# Patient Record
Sex: FEMALE | Race: WHITE
[De-identification: names, ages, dates, MRNs, and addresses within clinical notes are randomized per-mention and may not be internally consistent; named-entity substitution may affect disease eponyms.]

---

## 2020-09-26 ENCOUNTER — HOSPITAL ENCOUNTER (EMERGENCY)
Dept: HOSPITAL 50 - VM.ED | Age: 60
Discharge: HOME | End: 2020-09-26
Payer: COMMERCIAL

## 2020-09-26 DIAGNOSIS — M54.32: Primary | ICD-10-CM

## 2020-09-26 PROCEDURE — 96372 THER/PROPH/DIAG INJ SC/IM: CPT

## 2020-09-26 PROCEDURE — 99283 EMERGENCY DEPT VISIT LOW MDM: CPT

## 2020-09-26 NOTE — EDM.PDOC
ED HPI GENERAL MEDICAL PROBLEM





- General


Chief Complaint: Back Pain or Injury


Stated Complaint: PAIN IN BACK LEFT SIDE


Time Seen by Provider: 09/26/20 16:45


Source of Information: Reports: Patient


History Limitations: Reports: No Limitations





- History of Present Illness


INITIAL COMMENTS - FREE TEXT/NARRATIVE: 


Patient comes into the emergency department with complaint of left lower 

extremity sciatica.  Patient was diagnosed with sciatica approximately 3 to 4 

months ago and had been doctoring quite frequently regarding it.  She felt it 

was getting much better and did complete physical therapy this last week.  She 

was encouraged to seek chiropractic care by a family friend this past weekend 

she went in and had an adjustment approximately 3 days ago and another 

adjustment approximately 1 day ago.  She states after the first adjustment she 

felt really good and had no issues or concerns however right after her 

adjustment yesterday she began having the same discomfort and pain and sharp 

shooting sensation down her left lower extremity.  She denies it as a throbbing 

sensation and sharp shooting electrical shocks down the left lower extremity.  

She states that it is uncomfortable to sit stand or walk at times.  She was 

hopeful she could wait to go back into her primary care provider on Monday 

however the pain and discomfort is too significant today.  She has been taking 

Tylenol daily to help with the pain and discomfort.  Patient denies any chest 

pain, shortness of breath, numbness or tingling, dizziness, lightheadedness, 

nausea, vomiting, GI concerns, or peripheral edema.  Patient also denies any 

lower Extremity  CMS or range of motion concerns.Patient states she is been 

relatively healthy has no signs or symptoms of COVID-19 does not have any 

positive test findings.





Onset: Gradual


Quality: Reports: Sharp, Throbbing


Severity: Moderate


Improves with: Reports: Heat Therapy


Worsens with: Reports: Rest, Movement


Context: Reports: Activity


Associated Symptoms: Reports: No Other Symptoms


Treatments PTA: Reports: Acetaminophen





ED ROS GENERAL





- Review of Systems


Review Of Systems: Comprehensive ROS is negative, except as noted in HPI.


Constitutional: Reports: No Symptoms


HEENT: Reports: No Symptoms


Respiratory: Reports: No Symptoms


Cardiovascular: Reports: No Symptoms


Endocrine: Reports: No Symptoms


GI/Abdominal: Reports: No Symptoms


: Reports: No Symptoms


Musculoskeletal: Reports: No Symptoms


Skin: Reports: No Symptoms


Neurological: Reports: No Symptoms


Psychiatric: Reports: No Symptoms


Hematologic/Lymphatic: Reports: No Symptoms


Immunologic: Reports: No Symptoms





ED EXAM, GENERAL





- Physical Exam


Exam: See Below


Exam Limited By: No Limitations


General Appearance: Alert, WD/WN, No Apparent Distress


Head: Atraumatic, Normocephalic


Neck: Normal Inspection, Supple, Non-Tender, Full Range of Motion


Respiratory/Chest: No Respiratory Distress, Lungs Clear, Normal Breath Sounds, 

No Accessory Muscle Use, Chest Non-Tender


Cardiovascular: Normal Peripheral Pulses, Regular Rate, Rhythm, No Edema


GI/Abdominal: Normal Bowel Sounds, Soft, Non-Tender


Back Exam: Normal Inspection, Full Range of Motion


Extremities: Normal Inspection, Normal Range of Motion, Non-Tender, Normal 

Capillary Refill


Neurological: Alert, Oriented, CN II-XII Intact, Normal Gait, Normal Reflexes


Psychiatric: Normal Affect, Normal Mood


Skin Exam: Warm, Dry, Intact, Normal Color





Course





- Vital Signs


Last Recorded V/S: 





                                Last Vital Signs











Temp  36.8 C   09/26/20 16:40


 


Pulse  89   09/26/20 16:40


 


Resp  18   09/26/20 16:40


 


BP  173/76 H  09/26/20 16:40


 


Pulse Ox  98   09/26/20 16:40














- Orders/Labs/Meds


Meds: 





Medications














Discontinued Medications














Generic Name Dose Route Start Last Admin





  Trade Name Freq  PRN Reason Stop Dose Admin


 


Ketorolac Tromethamine  30 mg  09/26/20 16:59 





  Toradol  IM  09/26/20 17:00 





  ONETIME ONE  


 


Orphenadrine Citrate  60 mg  09/26/20 16:59 





  Norflex  IM  09/26/20 17:00 





  ONETIME ONE  


 


Prednisone  1 packet  09/26/20 16:59 





  Take Home: Prednisone 20 Mg, 2 Tab Pack  PO  09/26/20 17:00 





  ONETIME ONE  


 


Prednisone  20 mg  09/26/20 16:59 





  Prednisone  PO  09/26/20 17:00 





  ONETIME ONE  














Departure





- Departure


Time of Disposition: 17:20


Disposition: Home, Self-Care 01


Condition: Good


Clinical Impression: 


Sciatica


Qualifiers:


 Laterality: left Qualified Code(s): M54.32 - Sciatica, left side








- Discharge Information


*PRESCRIPTION DRUG MONITORING PROGRAM REVIEWED*: Not Applicable


*COPY OF PRESCRIPTION DRUG MONITORING REPORT IN PATIENT MIKALA: Not Applicable


Instructions:  Sciatica, Easy-to-Read, Cyclobenzaprine tablets, Prednisone 

tablets


Referrals: 


Lupe Machado MD [Primary Care Provider] - 


Additional Instructions: 


1. rest 


2. Can take flexeril every 4 hours for severe muscle pain 


3. Continue all at home medications


4. Activity and diet as tolerated 


5. Can take over the counter Tylenol and ibuprofen for any pain or discomfort 

can alternate between the two for maximum relief 


6. use ice or heat for 20 minutes at a time 3-4 leo a day 


7. Follow up with PCP if symptoms continue, return, or progress 


8. Call with any questions or concerns





*****The medication you have been prescribed today has a warning it may inhibit 

your response or action time. It is advised you do not drive or operate any 

device while using this medications. 


*****It is also advised this medication can not be shared or given to other 

individuals for it is against the law and one may be punished in the court of 

law. 





Sepsis Event Note (ED)





- Evaluation


Sepsis Screening Result: No Definite Risk





- Focused Exam


Vital Signs: 





                                   Vital Signs











  Temp Pulse Resp BP Pulse Ox


 


 09/26/20 16:40  36.8 C  89  18  173/76 H  98














- Assessment/Plan


Assessment:: 





1. Sciatica


Plan: 


1.  Ice Applied to the affected area


2.  Medication offered to the patient- norflex IM, prednisone po, Ketorlac IM 

given in the ER 


3. Prednisone and Flexeril take home packs given in the ER. 


4. Script for prednisone and Flexeril sent with the patient 


5. Education regarding splinting, activity, over-the-counter medications, and  

follow-up care provided. 


6.  All questions and concerns addressed with the patient prior to discharge

## 2020-10-11 ENCOUNTER — HOSPITAL ENCOUNTER (EMERGENCY)
Dept: HOSPITAL 50 - VM.ED | Age: 60
Discharge: HOME | End: 2020-10-11
Payer: COMMERCIAL

## 2020-10-11 DIAGNOSIS — M54.42: Primary | ICD-10-CM

## 2020-10-11 RX ADMIN — ORPHENADRINE CITRATE STA MG: 60 INJECTION INTRAMUSCULAR; INTRAVENOUS at 17:14

## 2020-10-11 RX ADMIN — KETOROLAC TROMETHAMINE ONE MG: 30 INJECTION, SOLUTION INTRAMUSCULAR at 17:13

## 2020-10-11 NOTE — EDM.PDOC
ED HPI GENERAL MEDICAL PROBLEM





- General


Chief Complaint: Lower Extremity Injury/Pain


Stated Complaint: SIATICA PAIN


Time Seen by Provider: 10/11/20 16:45


Source of Information: Reports: Patient


History Limitations: Reports: No Limitations





- History of Present Illness


INITIAL COMMENTS - FREE TEXT/NARRATIVE: 





Patient comes emergency department today with complaints of left-sided neck pain

that she has been dealing with since July.  This patient has been seen in the 

outpatient clinic at physical therapy for left-sided sciatic pain.  Since July. 

She has not seen her primary care provider for this.  She was seen in the 

emergency department a couple of weeks ago for very similar pain where she had 

some Flexeril prescribed for her.  She has had no recent falls trauma or injury.

 On Friday she received a new mattress that she is using on the floor to try 

help doing some exercises to help with her sciatica on top of her daily 

exercises from physical therapy.  She actually felt quite well on Friday night. 

Although on Saturday she had increasing pain of her sciatic.  She has been 

trying to do her stretches in the mat and her pain is getting much worse.  She 

has had no recent falls trauma or injury to her back.  No loss of bowel or 

bladder.  Pain starts in the left lower mid back and wraps around to her left 

buttocks and down to her left thigh.  She has no paresthesias over the lower 

extremities.  No loss of bowel or bladder.  No change in the functionality of 

her lower extremities.  She did not try any the Flexeril that she had at home 

for her pain today.  She has tried some Tylenol and ibuprofen.


  ** Left Leg


Pain Score (Numeric/FACES): 6





- Related Data


                                    Allergies











Allergy/AdvReac Type Severity Reaction Status Date / Time


 


No Known Allergies Allergy   Verified 10/11/20 16:34











Home Meds: 


                                    Home Meds





Cyclobenzaprine [Flexeril] 10 mg PO TID PRN #12 tablet 10/11/20 [Rx]


predniSONE 40 mg PO DAILY #10 tab 10/11/20 [Rx]











Past Medical History





- Past Health History


Medical/Surgical History: Denies Medical/Surgical History


Other Musculoskeletal History: sciatic pain





Social & Family History





- Tobacco Use


Smoking Status *Q: Never Smoker





Review of Systems





- Review of Systems


Review Of Systems: Comprehensive ROS is negative, except as noted in HPI.





ED EXAM, GENERAL





- Physical Exam


Exam: See Below


Exam Limited By: No Limitations


General Appearance: Alert, WD/WN, No Apparent Distress


Eye Exam: Bilateral Eye: EOMI, PERRL


Ears: Normal External Exam


Nose: Normal Inspection


Throat/Mouth: Normal Inspection


Head: Atraumatic


Neck: Normal Inspection


Respiratory/Chest: No Respiratory Distress, Lungs Clear, No Accessory Muscle Use


Cardiovascular: Normal Peripheral Pulses


GI/Abdominal: Normal Bowel Sounds, Soft, Non-Tender


 (Female) Exam: Deferred


Rectal (Female) Exam: Deferred


Back Exam: Normal Inspection, Full Range of Motion, Paraspinal Tenderness (Very 

lower left no signs of trauma. ).  No: CVA Tenderness (L), CVA Tenderness (R), 

Vertebral Tenderness


Extremities: Normal Inspection, Normal Range of Motion, Normal Capillary Refill,

 Other (DTRs lower extremities 2+ equal bilaterally. )


Neurological: Alert, Oriented, Normal Cognition, No Motor/Sensory Deficits





Course





- Vital Signs


Last Recorded V/S: 





                                Last Vital Signs











Temp  98.0 F   10/11/20 16:18


 


Pulse  87   10/11/20 16:18


 


Resp  16   10/11/20 16:18


 


BP  141/76 H  10/11/20 16:18


 


Pulse Ox  97   10/11/20 16:18














- Orders/Labs/Meds


Orders: 





                               Active Orders 24 hr











 Category Date Time Status


 


 Ketorolac [Toradol] Med  10/11/20 16:58 Once





 30 mg IM ONETIME ONE   


 


 Orphenadrine [Norflex] Med  10/11/20 16:58 Stat





 60 mg IM NOW STA   


 


 predniSONE Med  10/11/20 16:58 Once





 40 mg PO ONETIME ONE   














- Re-Assessments/Exams


Free Text/Narrative Re-Assessment/Exam: 





10/11/20 17:13


The patient was given Ketorolac 30mg IM


Norflex 60mg IM


Prednisone 40 mg PO. 





Departure





- Departure


Time of Disposition: 17:05


Disposition: Home, Self-Care 01


Clinical Impression: 


Sciatica


Qualifiers:


 Laterality: left Qualified Code(s): M54.32 - Sciatica, left side








- Discharge Information


Instructions:  Pain Medicine Instructions, Easy-to-Read, Sciatica Rehab-

[facility]


Additional Instructions: 


Continue with the stretches and the exercises at home. 


Heat or Ice to the area which ever works best for you. 


Flexeril 1 tablet 3 times a day as needed for the pain spasms. Rx sent to [facility]. 


Prednisone 40 mg daily for the next 5 days. RX sent to [facility]. 


See your PCP in the next week for recheck. 


Return to the ED if new or worsening symptoms.





Sepsis Event Note (ED)





- Evaluation


Sepsis Screening Result: No Definite Risk





- Focused Exam


Vital Signs: 





                                   Vital Signs











  Temp Pulse Resp BP Pulse Ox


 


 10/11/20 16:18  98.0 F  87  16  141/76 H  97














- My Orders


Last 24 Hours: 





My Active Orders





10/11/20 16:58


Ketorolac [Toradol]   30 mg IM ONETIME ONE 


Orphenadrine [Norflex]   60 mg IM NOW STA 


predniSONE   40 mg PO ONETIME ONE 














- Assessment/Plan


Last 24 Hours: 





My Active Orders





10/11/20 16:58


Ketorolac [Toradol]   30 mg IM ONETIME ONE 


Orphenadrine [Norflex]   60 mg IM NOW STA 


predniSONE   40 mg PO ONETIME ONE

## 2020-11-07 ENCOUNTER — HOSPITAL ENCOUNTER (EMERGENCY)
Dept: HOSPITAL 50 - VM.ED | Age: 60
Discharge: HOME | End: 2020-11-07
Payer: COMMERCIAL

## 2020-11-07 DIAGNOSIS — G89.29: ICD-10-CM

## 2020-11-07 DIAGNOSIS — Z90.710: ICD-10-CM

## 2020-11-07 DIAGNOSIS — M53.3: Primary | ICD-10-CM

## 2020-11-07 PROCEDURE — 99283 EMERGENCY DEPT VISIT LOW MDM: CPT

## 2020-11-07 PROCEDURE — 96372 THER/PROPH/DIAG INJ SC/IM: CPT

## 2020-11-07 NOTE — EDM.PDOC
ED HPI GENERAL MEDICAL PROBLEM





- General


Chief Complaint: Back Pain or Injury


Stated Complaint: SI JOINT PAIN


Time Seen by Provider: 11/07/20 01:05


Source of Information: Reports: Patient


History Limitations: Reports: No Limitations





- History of Present Illness


INITIAL COMMENTS - FREE TEXT/NARRATIVE: 


Patient comes into the emergency department with complaints of SI joint pain.  

Patient states that she has been working with neurology Regarding her chronic 

back And SI joint.  Patient states that she had contacted her doctor this 

morning and they are going to try to get her set up with the pain clinic to have

injections in detail the pain and discomfort goes away.  Patient has had 

multiple MRIs/CT scans completed as well as going to physical therapy and 

chiropractic care.  Patient states this afternoon she was starting to feel 

better she had taken a couple of her Flexeril's with pretty good relief 

according to her.  However this evening approximately around 11 PM or elmo

roximately 2 hours prior to arrival to the emergency department she states that 

she had sharp shooting significant discomfort and pain down the left leg and she

has not been able to get it under control.  She ended up taking more Flexeril 

with 0 relief.  Patient has been seen in the emergency department before and 

received Norflex IM injection as well as a Toradol injection with moderate 

relief.  Patient would like that injection again if at all possible to help with

the pain discomfort.  Patient is also looking for some pain medications to help 

her get through this weekend until she can have her injection completed in the 

pain clinic early next week. Patient states when she rubs her thigh it does 

provide minimal relief however she states moving especially walking with the SI 

joint causes major significant pain and discomfort.  Patient denies any chest 

pain, shortness of breath, dizziness, CMS concerns, range of motion concerns, 

numbness or tingling in either of her upper or lower extremities, headache, 

blurred vision, visual changes, abdominal pain, genitourinary concerns, or 

peripheral edema.





Onset: Gradual


Duration: Getting Worse


Location: Reports: Lower Extremity, Left


Quality: Reports: Sharp, Throbbing


Severity: Severe


Improves with: Reports: Rest


Worsens with: Reports: Movement


Associated Symptoms: Reports: No Other Symptoms


Treatments PTA: Reports: Other (see below) (flexiril )


  ** Left Back


Pain Score (Numeric/FACES): 10





- Related Data


                                    Allergies











Allergy/AdvReac Type Severity Reaction Status Date / Time


 


No Known Allergies Allergy   Verified 11/07/20 01:02











Home Meds: 


                                    Home Meds





Cyclobenzaprine [Flexeril] 10 mg PO TID PRN 11/07/20 [History]


Diclofenac Sodium 75 mg PO BID PRN 11/07/20 [History]


predniSONE [Prednisone] 20 mg PO DAILY #5 tablet 11/07/20 [Rx]











Past Medical History





- Past Health History


Medical/Surgical History: Denies Medical/Surgical History


Other Musculoskeletal History: sciatic pain


Oncologic (Cancer) History: Reports: Uterine





- Past Surgical History


Female  Surgical History: Reports: Hysterectomy





Social & Family History





- Tobacco Use


Tobacco Use Status *Q: Unknown Ever Used Tobacco





ED ROS GENERAL





- Review of Systems


Review Of Systems: Comprehensive ROS is negative, except as noted in HPI.


Constitutional: Reports: No Symptoms


HEENT: Reports: No Symptoms


Respiratory: Reports: No Symptoms


Cardiovascular: Reports: No Symptoms


Endocrine: Reports: No Symptoms


GI/Abdominal: Reports: No Symptoms


: Reports: No Symptoms


Musculoskeletal: Reports: Leg Pain


Skin: Reports: No Symptoms


Neurological: Reports: No Symptoms


Psychiatric: Reports: No Symptoms


Hematologic/Lymphatic: Reports: No Symptoms


Immunologic: Reports: No Symptoms





ED EXAM, GENERAL





- Physical Exam


Exam: See Below


Exam Limited By: No Limitations


General Appearance: Alert, WD/WN, No Apparent Distress


Head: Atraumatic, Normocephalic


Neck: Normal Inspection, Supple, Non-Tender, Full Range of Motion


Respiratory/Chest: No Respiratory Distress, No Accessory Muscle Use, Chest Non-

Tender


Cardiovascular: Normal Peripheral Pulses, Regular Rate, Rhythm, No Edema


 (Female) Exam: Deferred


Rectal (Female) Exam: Deferred


Back Exam: Full Range of Motion


Extremities: Normal Inspection, Normal Range of Motion, Non-Tender, No Pedal 

Edema, Normal Capillary Refill


Neurological: Alert, Oriented, CN II-XII Intact, Normal Cognition, Normal Gait


Psychiatric: Normal Affect, Normal Mood


Skin Exam: Warm, Dry, Intact, Normal Color, No Rash





Course





- Vital Signs


Last Recorded V/S: 





                                Last Vital Signs











Temp  36.3 C   11/07/20 01:03


 


Pulse  80   11/07/20 01:03


 


Resp  20   11/07/20 01:03


 


BP  180/79 H  11/07/20 01:03


 


Pulse Ox  99   11/07/20 01:03














Departure





- Departure


Time of Disposition: 01:30


Disposition: Home, Self-Care 01


Condition: Good


Clinical Impression: 


 Chronic SI joint pain








- Discharge Information


*PRESCRIPTION DRUG MONITORING PROGRAM REVIEWED*: Not Applicable


*COPY OF PRESCRIPTION DRUG MONITORING REPORT IN PATIENT GWEN: Not Applicable


Instructions:  Sacroiliac Joint Dysfunction, Acetaminophen; Oxycodone tablets, 

Prednisone tablets


Additional Instructions: 


1. rest 


2. Can take your Flexeril as previously prescribed. Can take a Percocet for 

severe pain but must be 4 hours apart from Flexeril 


3. Take prednisone for the next 5 days to help decrease the inflammation in the 

area and joint 


4. Continue all at home medications


5. Activity and diet as tolerated 


6. Can take over the counter Tylenol for any pain or discomfort 


7. use ice or heat for 20 minutes at a time 3-4 verónica a day 


8. Follow up with PCP if symptoms continue, return, or progress. Continue with 

your pain clinic appointment early next week when it is scheduled.  


9. Call with any questions or concerns 








*****The medication you have been prescribed today has a warning it may inhibit 

your response or action time. It is advised you do not drive or operate any 

device while using this medications. 


*****It is also advised this medication can not be shared or given to other 

individuals for it is against the law and one may be punished in the court of 

law. 





Sepsis Event Note (ED)





- Evaluation


Sepsis Screening Result: No Definite Risk





- Focused Exam


Vital Signs: 





                                   Vital Signs











  Temp Pulse Resp BP Pulse Ox


 


 11/07/20 01:03  36.3 C  80  20  180/79 H  99














- Assessment/Plan


Assessment:: 





1. SI joint pain 


Plan: 





1.  Ice Applied to the affected area


2.  Medication offered to the patient- Toradol and Norflex injections 


3. Prednisone 20mg PO script given to her to start in the am 


4. Take home package given for Percocet 5/325mg PO  


5.  Education regarding splinting, activity, over-the-counter medications, and  

follow-up care provided. 


6.  All questions and concerns addressed with the patient prior to discharge

## 2020-11-09 ENCOUNTER — HOSPITAL ENCOUNTER (EMERGENCY)
Dept: HOSPITAL 50 - VM.ED | Age: 60
Discharge: HOME | End: 2020-11-09
Payer: COMMERCIAL

## 2020-11-09 DIAGNOSIS — M53.3: Primary | ICD-10-CM

## 2020-11-09 NOTE — EDM.PDOC
ED HPI GENERAL MEDICAL PROBLEM





- General


Chief Complaint: Back Pain or Injury


Stated Complaint: back pain


Time Seen by Provider: 11/09/20 04:46


Source of Information: Reports: Patient


History Limitations: Reports: No Limitations





- History of Present Illness


INITIAL COMMENTS - FREE TEXT/NARRATIVE: 





Pt. presents to ER with complaints of acute on chronic L lower extremity pain. 

Pt. states that the discomfort has been persent for months and that she is 

undergoing physical therapy for the discomfort. She is scheduled to see pain 

management hopefully today for an SI joint injection. She was seen on Friday by 

Miriam Schofield NP who gave the patient injections of norflex and toradol. She also

gave the patient a 5 pack of percocet 5/325mg and the patient states that she 

took the last one late last night.


Pt. states that this discomfort is similar to what she has experienced in the 

past. She has had MRIs of her pelvis and lumbar spine, and has seen neurology. 


She denies any fever or chills. No nausea, vomiting, or diarrhea. No 

numbness/tingling in the distal portion of the extremity. Denies any saddle 

anesthesia. No problems with incontinence of retention of urine.


Location: Reports: Back, Pelvis


Quality: Reports: Sharp, Throbbing


Severity: Severe


  ** Left Lower Back


Pain Score (Numeric/FACES): 10





- Related Data


                                    Allergies











Allergy/AdvReac Type Severity Reaction Status Date / Time


 


No Known Allergies Allergy   Verified 11/09/20 04:35











Home Meds: 


                                    Home Meds





Cyclobenzaprine [Flexeril] 10 mg PO TID PRN 11/07/20 [History]


Diclofenac Sodium 75 mg PO BID PRN 11/07/20 [History]


predniSONE [Prednisone] 20 mg PO DAILY #5 tablet 11/07/20 [Rx]











Past Medical History





- Past Health History


Medical/Surgical History: Denies Medical/Surgical History


Other Musculoskeletal History: sciatic pain, left sided SI joint problems


Oncologic (Cancer) History: Reports: Uterine





- Past Surgical History


Female  Surgical History: Reports: Hysterectomy





Social & Family History





- Tobacco Use


Tobacco Use Status *Q: Never Tobacco User





ED ROS GENERAL





- Review of Systems


Review Of Systems: Comprehensive ROS is negative, except as noted in HPI.





ED EXAM, GENERAL





- Physical Exam


Exam: See Below


Exam Limited By: No Limitations


General Appearance: Alert, WD/WN, Anxious, Moderate Distress


Extremities: Normal Inspection, Normal Range of Motion, No Pedal Edema, Normal 

Capillary Refill


Neurological: Alert, Oriented, CN II-XII Intact, Normal Cognition, Normal Gait, 

Normal Reflexes, No Motor/Sensory Deficits


Skin Exam: Warm, Dry, Intact, Normal Color, No Rash





Course





- Vital Signs


Last Recorded V/S: 


                                Last Vital Signs











Temp  36.6 C   11/09/20 04:36


 


Pulse  90   11/09/20 04:36


 


Resp  20   11/09/20 04:36


 


BP  147/84 H  11/09/20 04:36


 


Pulse Ox  98   11/09/20 04:36














- Orders/Labs/Meds


Meds: 


Medications














Discontinued Medications














Generic Name Dose Route Start Last Admin





  Trade Name Lencho  PRN Reason Stop Dose Admin


 


Ketorolac Tromethamine  30 mg  11/09/20 04:53  11/09/20 05:00





  Toradol  IM  11/09/20 04:54  30 mg





  ONETIME ONE   Administration


 


Orphenadrine Citrate  60 mg  11/09/20 04:53  11/09/20 05:00





  Norflex  IM  11/09/20 04:54  60 mg





  ONETIME ONE   Administration


 


Oxycodone/Acetaminophen  1 packet  11/09/20 04:54  11/09/20 05:00





  Take Home: Acetamin/Oxycodon 325-5 Mg, 5 Pack  PO  11/09/20 04:55  1 packet





  ONETIME ONE   Administration














Departure





- Departure


Time of Disposition: 05:07


Disposition: Home, Self-Care 01


Clinical Impression: 


 Chronic SI joint pain








- Discharge Information


Instructions:  Sacroiliac Joint Dysfunction


Referrals: 


PCP,Unobtain [Primary Care Provider] - 


Forms:  ED Department Discharge


Additional Instructions: 


Oxycodone/acetaminophen 5/325mg


1 tab every 6 hours as needed for pain





Follow-up with your PCP/Pain clinic today











Sepsis Event Note (ED)





- Evaluation


Sepsis Screening Result: No Definite Risk





- Focused Exam


Vital Signs: 


                                   Vital Signs











  Temp Pulse Resp BP Pulse Ox


 


 11/09/20 04:36  36.6 C  90  20  147/84 H  98














- Problem List Review


Problem List Initiated/Reviewed/Updated: Yes





- Assessment/Plan


Plan: 





Oxycodone/acetaminophen 5/325mg


1 tab every 6 hours as needed for pain





Follow-up with your PCP/Pain clinic today





Finish your course of prednisone.





Continue with your other medications.

## 2020-11-11 ENCOUNTER — HOSPITAL ENCOUNTER (EMERGENCY)
Dept: HOSPITAL 50 - VM.ED | Age: 60
Discharge: HOME | End: 2020-11-11
Payer: COMMERCIAL

## 2020-11-11 DIAGNOSIS — Z79.899: ICD-10-CM

## 2020-11-11 DIAGNOSIS — M53.3: Primary | ICD-10-CM

## 2020-11-11 DIAGNOSIS — G89.29: ICD-10-CM

## 2020-11-11 PROCEDURE — 96372 THER/PROPH/DIAG INJ SC/IM: CPT

## 2020-11-11 PROCEDURE — 99283 EMERGENCY DEPT VISIT LOW MDM: CPT

## 2020-11-11 NOTE — EDM.PDOC
ED HPI GENERAL MEDICAL PROBLEM





- General


Stated Complaint: BACK PAIN


Time Seen by Provider: 11/11/20 12:18





- History of Present Illness


INITIAL COMMENTS - FREE TEXT/NARRATIVE: 





Patient comes emergency department today with complaints of left anterior thigh 

and leg pain.  This patient has been struggling with recurrent SI joint pain on 

the left side that radiates down her left buttocks around to the anterior aspect

of her left thigh.  Is been going on since July.  She has been seen multiple 

times in the emergency department most notably last week and x2.  She has been 

taking her prednisone and her Flexeril without much improvement.  She has been 

using Percocets as well for this pain.  She has no recent falls trauma or 

injury.  She has been seen multiple times in the primary care clinic as well as 

the ER.  Today she was actually seen at the pain clinic in [place] where she 

received an injection in her spine which did help with her pain down the left 

buttocks but it did not help with the pain in the anterior aspect of the left 

thigh.  She has no change in her functionality of her upper or lower 

extremities.  No paresthesias of her upper or lower extremities.  She has no 

change in the strength of her lower extremities.  No loss of bowel or bladder.  

She has seen physical therapy for this but she does not feel it does much.  She 

has been taking her Flexeril and diclofenac.  Although today after she had the 

injection in her spine the pain down her left buttocks has resolved but the pain

on the left anterior thigh is much worse.  It is a burning searing shooting pain

that is worse with movement.  She has no fever or chills.  No hematuria dysuria 

or urinary frequency.  No flank pain.  He recently had an MRI of her lower back 

that showed no acute findings according to the patient.


  ** Left Hip


Pain Score (Numeric/FACES): 10





- Related Data


                                    Allergies











Allergy/AdvReac Type Severity Reaction Status Date / Time


 


No Known Allergies Allergy   Verified 11/11/20 13:17











Home Meds: 


                                    Home Meds





Cyclobenzaprine [Flexeril] 10 mg PO TID PRN 11/07/20 [History]


Diclofenac Sodium 75 mg PO BID PRN 11/07/20 [History]


predniSONE [Prednisone] 20 mg PO DAILY #5 tablet 11/07/20 [Rx]


Gabapentin [Neurontin] 300 mg PO DAILY #30 cap 11/11/20 [Rx]











Past Medical History





- Past Health History


Medical/Surgical History: Denies Medical/Surgical History


Other Musculoskeletal History: sciatic pain, left sided SI joint problems


Oncologic (Cancer) History: Reports: Uterine





- Past Surgical History


Female  Surgical History: Reports: Hysterectomy





ED ROS GENERAL





- Review of Systems


Review Of Systems: Comprehensive ROS is negative, except as noted in HPI.





ED EXAM,LOWER BACK PAIN/INJURY





- Physical Exam


Exam: See Below


Exam Limited By: No Limitations


General Appearance: Alert, WD/WN, Moderate Distress


Eye Exam: Bilateral Eye: EOMI, PERRL


Ears: Normal External Exam


Nose: Normal Inspection


Throat/Mouth: Normal Inspection


Head: Atraumatic, Normocephalic


Neck: Normal Inspection, Supple


Respiratory/Chest: No Respiratory Distress, Lungs Clear, No Accessory Muscle 

Use, Chest Non-Tender


Cardiovascular: Normal Peripheral Pulses, Regular Rate, Rhythm


GI/Abdominal: Normal Bowel Sounds, Soft


 (Female) Exam: Deferred


Rectal (Female) Exam: Deferred


Back Exam: Normal Inspection, Full Range of Motion.  No: CVA Tenderness (L), CVA

 Tenderness (R), Muscle Spasm, Paraspinal Tenderness, Vertebral Tenderness


Extremities: Normal Inspection, Normal Range of Motion, No Pedal Edema, Normal 

Capillary Refill


Neurological: Alert, Normal Mood/Affect, Normal Dorsiflexion, CN II-XII Intact, 

Normal Plantar Flexion, Normal Gait, Normal Reflexes, No Motor/Sensory Deficits,

 Oriented x 3


DTR - Lower Extremities: 0: Ankle (R), 2+: Knee (R), Knee (L), Ankle (R), Ankle 

(L)


Psychiatric: Anxious


Skin Exam: Warm, Dry, Intact, Normal Color, No Rash





Course





- Vital Signs


Last Recorded V/S: 


                                Last Vital Signs











Temp  97.7 F   11/11/20 12:35


 


Pulse  88   11/11/20 12:35


 


Resp  20   11/11/20 12:35


 


BP  147/74 H  11/11/20 12:35


 


Pulse Ox  100   11/11/20 12:35














- Orders/Labs/Meds


Meds: 


Medications














Discontinued Medications














Generic Name Dose Route Start Last Admin





  Trade Name Freq  PRN Reason Stop Dose Admin


 


Gabapentin  300 mg  11/11/20 13:11  11/11/20 13:20





  Neurontin  PO  11/11/20 13:12  300 mg





  ONETIME ONE   Administration


 


Hydromorphone HCl  1 mg  11/11/20 12:24  11/11/20 12:30





  Dilaudid  IM  11/11/20 12:25  1 mg





  ONETIME ONE   Administration


 


Promethazine HCl  12.5 mg  11/11/20 12:24  11/11/20 12:30





  Phenergan  IM  11/11/20 12:25  12.5 mg





  ONETIME ONE   Administration














- Radiology Interpretation


Free Text/Narrative:: 





I did review her MRI that was completed on 10/19/20.  MRI per radiology shows 

mild lumbar spondylosis.  No high-grade foraminal or central canal stenosis.





- Re-Assessments/Exams


Free Text/Narrative Re-Assessment/Exam: 





11/11/20 18:20


The patient initially was given 1 mg of Dilaudid and 12.5 mg of Phenergan IM.





Quite a bit of improvement after the above therapy.





Viewing her chart she has received multiple doses of steroids in the past for 

this type of SI joint sciatica radiculopathy.  She is still currently on a 

prednisone burst dose which is not doing anything as it typically has not in the

 past either.  This is really becoming a chronic issue of this pain that she has

 been seen in the emergency department for multiple times.  She just most 

recently received a prescription for Percocets as well.  She does have some 

daily pain with this and I wonder if she is not better served by some type of 

modalities such as gabapentin.  The injection took care of some of the posterior

 gluteal pain although it has not done much for the anterior thigh pain.  

Started on gabapentin 300 mg a day and slowly titrate her up to 3 times daily.  

She also needs to see her primary care provider for chronic management of her 

chronic symptoms as she will be best served in the primary care setting versus 

the emergency department which is been her mainstay of management for her 

chronic pain as of late.  I would like her to contact the pain clinic today as 

well as she is having worsening pain following her procedure today.  She is 

comfortable with this plan and her questions are answered.





Departure





- Departure


Time of Disposition: 13:11


Disposition: Home, Self-Care 01


Clinical Impression: 


 Chronic left SI joint pain








- Discharge Information


Prescriptions: 


Gabapentin [Neurontin] 300 mg PO DAILY #30 cap


Instructions:  Pain Medicine Instructions, Easy-to-Read


Referrals: 


Nancie Weaver MD [Primary Care Provider] - 


Forms:  ED Department Discharge


Additional Instructions: 


Continue previous medications. 


Start Gabapentin 1 tablet daily for 2 days. Then twice daily for 2 days and then

three times a day. Caution sedation. May alter the dosing pattern longer days if

problems with sedation. First dose given in the ED and RX sent to [facility]. 


Contact the [place] pain clinic after your ER visit today and let them know of 

your concerns. 


Return to the ED if new or worsening symptoms. 


Follow up Dr. Weaver in 7 days for recheck sooner if worse or not improving. 





Sepsis Event Note (ED)





- Focused Exam


Vital Signs: 


                                   Vital Signs











  Temp Pulse Resp BP Pulse Ox


 


 11/11/20 12:35  97.7 F  88  20  147/74 H  100

## 2020-11-12 ENCOUNTER — HOSPITAL ENCOUNTER (EMERGENCY)
Dept: HOSPITAL 50 - VM.ED | Age: 60
Discharge: HOME | End: 2020-11-12
Payer: COMMERCIAL

## 2020-11-12 DIAGNOSIS — M53.3: Primary | ICD-10-CM

## 2020-11-12 DIAGNOSIS — G89.29: ICD-10-CM

## 2020-11-12 DIAGNOSIS — Z79.899: ICD-10-CM

## 2020-11-12 PROCEDURE — 99283 EMERGENCY DEPT VISIT LOW MDM: CPT

## 2020-11-12 PROCEDURE — 96372 THER/PROPH/DIAG INJ SC/IM: CPT

## 2020-11-14 ENCOUNTER — HOSPITAL ENCOUNTER (EMERGENCY)
Dept: HOSPITAL 50 - VM.ED | Age: 60
Discharge: HOME | End: 2020-11-14
Payer: COMMERCIAL

## 2020-11-14 DIAGNOSIS — M79.652: ICD-10-CM

## 2020-11-14 DIAGNOSIS — M54.16: Primary | ICD-10-CM

## 2020-11-14 DIAGNOSIS — M25.552: ICD-10-CM

## 2020-11-14 PROCEDURE — 99284 EMERGENCY DEPT VISIT MOD MDM: CPT

## 2020-11-14 PROCEDURE — 99283 EMERGENCY DEPT VISIT LOW MDM: CPT

## 2020-11-14 PROCEDURE — 96372 THER/PROPH/DIAG INJ SC/IM: CPT

## 2020-11-14 PROCEDURE — 72192 CT PELVIS W/O DYE: CPT

## 2020-11-14 NOTE — CT
______________________________________________________________________________   

  

[id number] CT/CT Pelvis WO IV  

Exam:   

   

 CT Pelvis WO IV  

   

 Clinical Data:   

   

 LEFT SIDE HIP PAIN  

   

 LEFT SIDE SACROILIAC PAIN  

   

 COMPARISON:   

   

 NO PREVIOUS SIMILAR EXAM IS AVAILABLE  

   

 FINDINGS:   

   

 There is no fracture, loss of joint space, or abnormal diastases  

   

 There is no soft tissue abnormality  

   

 There is no hematoma  

   

 Consider MRI   

   

 IMPRESSION:  

   

 NEGATIVE CT  

   

 Electronically signed by Vikas Schafer MD on 11/14/2020 7:30 PM  

   

  

Vikas Schafer MD                 

 11/14/20 1932    

  

Thank you for allowing us to participate in the care of your patient.

## 2020-11-14 NOTE — EDM.PDOC
ED HPI GENERAL MEDICAL PROBLEM





- General


Chief Complaint: Lower Extremity Injury/Pain


Stated Complaint: STOMACH PAIN


Time Seen by Provider: 11/14/20 18:03


Source of Information: Reports: Patient





- History of Present Illness


INITIAL COMMENTS - FREE TEXT/NARRATIVE: 





Marybeth is a 59 y/o female who comes to the ER tonight with pain in her left 

lower back region, left hip region, and left groin. This is same ongoing pain 

that she has been having for the last few weeks. In fact her pain started in 

mid-July. She has been seen by chiropractic, PT, and had an SI pain injection. 

Most recently this week she was seen in the [facility] ER on 11/7/2020, 11/9/2020, 

11/11/2020, and 11/12/2020. She has been given Dilaudid injections along with a 

various combination of muscle relaxers and steroids. She reports that the 

Percocet she was given on 11/12/2020 kothari snot touch the pain. She then saw Dr Weaver in the clinic and labs were done and she was started on Voltaren and 

Gabapentin. She does have an Ortho Consult coming up on 12/2/2020 in [place]. Per 

Dr Weaver's note on 11/12/20 it is noted that Pelvis CT would be considered if 

her pain persists.


  ** Left Leg


Pain Score (Numeric/FACES): 9





- Related Data


                                    Allergies











Allergy/AdvReac Type Severity Reaction Status Date / Time


 


No Known Allergies Allergy   Verified 11/14/20 18:07











Home Meds: 


                                    Home Meds





Cyclobenzaprine [Flexeril] 10 mg PO TID PRN 11/07/20 [History]


Diclofenac Sodium 75 mg PO BID PRN 11/07/20 [History]


Gabapentin [Neurontin] 300 mg PO DAILY #30 cap 11/11/20 [Rx]


Acetaminophen 650 mg PO Q4H PRN 11/14/20 [History]


Acetaminophen/oxyCODONE [Percocet 325-5 MG] 1 each PO Q6H PRN 11/14/20 [History]











Past Medical History





- Past Health History


Medical/Surgical History: Denies Medical/Surgical History


Other Musculoskeletal History: sciatic pain, left sided SI joint problems


Oncologic (Cancer) History: Reports: Uterine





- Past Surgical History


Female  Surgical History: Reports: Hysterectomy





Social & Family History





- Family History


Family Medical History: Unobtainable





- Tobacco Use


Tobacco Use Status *Q: Never Tobacco User





- Caffeine Use


Caffeine Use: Reports: None





Review of Systems





- Review of Systems


Review Of Systems: See Below


Constitutional: Reports: No Symptoms


Eyes: Reports: No Symptoms


Ears: Reports: No Symptoms


Nose: Reports: No Symptoms


Mouth/Throat: Reports: No Symptoms


Respiratory: Reports: No Symptoms


Cardiovascular: Reports: No Symptoms


GI/Abdominal: Reports: No Symptoms


Genitourinary: Reports: No Symptoms


Musculoskeletal: Reports: Back Pain, Joint Pain (left hip/left thigh)


Skin: Reports: No Symptoms





ED EXAM, GENERAL





- Physical Exam


Exam: See Below


General Appearance: Alert, WD/WN, Moderate Distress (vitor is sitting in chair

 in exam room, roxking back and forth and rubbing her left thigh; teary eyed)


Ears: Hearing Grossly Normal


Nose: Normal Inspection


Throat/Mouth: Normal Voice


Head: Atraumatic, Normocephalic


Respiratory/Chest: No Respiratory Distress, Lungs Clear


Cardiovascular: Regular Rate, Rhythm


GI/Abdominal: Soft, Non-Tender


 (Female) Exam: Deferred


Rectal (Female) Exam: Deferred


Back Exam: Other (+tender in the left lower back/hip)


Extremities: Other (+tenderness over the left thigh region)


Neurological: Alert, Oriented, CN II-XII Intact


Psychiatric: Tearful


Skin Exam: Warm, Dry, Intact, Normal Color


Lymphatic: No Adenopathy





Course





- Vital Signs


Text/Narrative:: 





1803 The patient was seen by the CNP. Her records from the ER and [facility]

 were reviewed. She was given Dilaudid 1mg IM.





1850 Further review of clinic records notes that Dr Weaver considered a Pelvis CT

 if her pain persists, since she is her again in the ER to night that study was 

ordered.





1935 CT results reviewed. No acute findings noted in the pelvis, recommend MRI 

for further diagnostics. Patient still having good pain relief from the 

Dilaudid. CNP discussed with the patient and her  the need to follow up 

with Dr Weaver on Monday. Also discussed considering going to one of the Walk In 

Ortho Urgent Care clinics in [place] on Monday or seeing if she can get into the 

Orthopod in [place] sooner than 12/2/2020. Patient and her  will 

consider options and proceed on Monday. She was advised to continue all the 

current medications that she has been prescribed. She can increase the 

Gabapentin tonight to TID.


She was given discharge instructions and left the ER in stable condition.





Last Recorded V/S: 


                                Last Vital Signs











Temp  36.8 C   11/14/20 17:40


 


Pulse  106 H  11/14/20 17:40


 


Resp  16   11/14/20 17:40


 


BP  149/80 H  11/14/20 17:40


 


Pulse Ox  97   11/14/20 17:40














- Orders/Labs/Meds


Meds: 


Medications














Discontinued Medications














Generic Name Dose Route Start Last Admin





  Trade Name Lencho  PRN Reason Stop Dose Admin


 


Hydromorphone HCl  1 mg  11/14/20 18:35  11/14/20 18:42





  Dilaudid  IM  11/14/20 18:36  1 mg





  ONETIME ONE   Administration














- Radiology Interpretation


Free Text/Narrative:: 





CT Pelvis WO=no acute findings, recommend MRI (See final radiology report)





Departure





- Departure


Time of Disposition: 19:42


Disposition: Home, Self-Care 01


Clinical Impression: 


 Left lumbar radiculopathy, Left hip pain, Left thigh pain








- Discharge Information


Instructions:  Hip Pain, Pain Medicine Instructions, Easy-to-Read, Pain Without 

a Known Cause, Radicular Pain


Referrals: 


Nancie Weaver MD [Primary Care Provider] - 


Forms:  ED Department Discharge





Sepsis Event Note (ED)





- Evaluation


Sepsis Screening Result: No Definite Risk





- Focused Exam


Vital Signs: 


                                   Vital Signs











  Temp Pulse Resp BP Pulse Ox


 


 11/14/20 17:40  36.8 C  106 H  16  149/80 H  97














- Assessment/Plan


Assessment:: 





1)Left Lumbar Radiculopathy


2)Left Hip Pain


3)Left Thigh Pain


Plan: 





-You may increase the Gabapentin tonight to 3x/day use





-Continue all other meds as prescribed





-Attempt cold or heat therapy





-Call Dr Weaver's clinic first thing on Monday AM to discuss current symptoms and

how to proceed





-Consider going to [place] to one of the Walk In Orthopedic Urgent Care Clinics at

either [place] or [facility]. You do not need an appt to do this and you may get 

into an Orthopedic Specialist sooner than waiting until 12/2/2020





-Return to the ER for any concerns or if you cannot manage your pain at  home.

## 2020-11-23 ENCOUNTER — HOSPITAL ENCOUNTER (EMERGENCY)
Dept: HOSPITAL 50 - VM.ED | Age: 60
Discharge: HOME | End: 2020-11-23
Payer: COMMERCIAL

## 2020-11-23 DIAGNOSIS — Z79.899: ICD-10-CM

## 2020-11-23 DIAGNOSIS — M25.552: Primary | ICD-10-CM

## 2020-11-23 DIAGNOSIS — M79.652: ICD-10-CM

## 2020-11-23 DIAGNOSIS — G89.29: ICD-10-CM

## 2020-11-23 DIAGNOSIS — M54.16: ICD-10-CM

## 2020-11-23 PROCEDURE — 99283 EMERGENCY DEPT VISIT LOW MDM: CPT

## 2020-11-23 PROCEDURE — 96372 THER/PROPH/DIAG INJ SC/IM: CPT

## 2020-11-23 NOTE — EDM.PDOC
ED HPI GENERAL MEDICAL PROBLEM





- General


Chief Complaint: Back Pain or Injury


Stated Complaint: Left low back, hip, groin pain


Time Seen by Provider: 11/23/20 04:45


Source of Information: Reports: Patient


History Limitations: Reports: No Limitations





- History of Present Illness


INITIAL COMMENTS - FREE TEXT/NARRATIVE: 





Patient comes emergency department with her chronic left leg pain.  This patient

has been seen multiple times by myself as well as many of my partners for 

chronic left radicular type pain on the anterior thigh since July.  Most 

recently I did seen her last week and given her some injections for pain acutely

and started her on Neurontin as she has not been placed on anything for chronic 

management with her chronic pain through her primary care provider.  She has 

seen the pain clinic in [place] and received an epidural injection that did not 

improve.  She recently saw Ortho in [place] where she received a trochanter 

bursitis injection which did nothing for the pain.  This pain is the same pain 

that she has had since July.  It starts on the anterior iliac crest and radiates

down medially to the proximal anterior thigh and into the groin.  There is no 

rash or lesions.  She has not fallen.  There is no loss of bowel or bladder.  

She has no change in the functionality of her lower extremities.  She denies any

paresthesia of the lower extremities.  She has had multiple MRIs CTs and other 

evaluations without any definitive cause of her chronic pain.  She did see her 

primary care provider last week and was told to continue with her Neurontin 

although her neurontin was not refilled and she is running out.


Treatments PTA: Reports: Other (see below)


Other Treatments PTA: Gabapentin 7pm, Flexeril 1:30am.


  ** left hip/groin/low back


Pain Score (Numeric/FACES): 9





- Related Data


                                    Allergies











Allergy/AdvReac Type Severity Reaction Status Date / Time


 


No Known Allergies Allergy   Verified 11/14/20 18:07











Home Meds: 


                                    Home Meds





Cyclobenzaprine [Flexeril] 10 mg PO TID PRN 11/07/20 [History]


Diclofenac Sodium 75 mg PO BID PRN 11/07/20 [History]


Acetaminophen 650 mg PO Q4H PRN 11/14/20 [History]


Gabapentin [Neurontin] 300 mg PO TID #30 cap 11/23/20 [Rx]











Past Medical History





- Past Health History


Medical/Surgical History: Denies Medical/Surgical History


Musculoskeletal History: Reports: Other (See Below)


Other Musculoskeletal History: sciatic pain, left sided SI joint problems


Oncologic (Cancer) History: Reports: Uterine





- Past Surgical History


Female  Surgical History: Reports: Hysterectomy





Social & Family History





- Family History


Family Medical History: Unobtainable





- Tobacco Use


Tobacco Use Status *Q: Unknown Ever Used Tobacco





- Caffeine Use


Caffeine Use: Reports: None





ED ROS GENERAL





- Review of Systems


Review Of Systems: Comprehensive ROS is negative, except as noted in HPI.





ED EXAM,LOWER BACK PAIN/INJURY





- Physical Exam


Exam: See Below


Exam Limited By: No Limitations


General Appearance: Alert, WD/WN, Anxious


Respiratory/Chest: No Respiratory Distress, Lungs Clear, No Accessory Muscle 

Use, Chest Non-Tender


Cardiovascular: Normal Peripheral Pulses, Regular Rate, Rhythm


GI/Abdominal: Normal Bowel Sounds, Soft, Non-Tender


 (Female) Exam: Deferred


Rectal (Female) Exam: Deferred


Back Exam: Normal Inspection, Full Range of Motion.  No: CVA Tenderness (L), CVA

 Tenderness (R), Decreased Range of Motion, Muscle Spasm, Paraspinal Tenderness,

 Vertebral Tenderness


Extremities: Normal Inspection, Normal Range of Motion, Non-Tender, No Pedal 

Edema, Normal Capillary Refill


Neurological: Alert, Normal Mood/Affect, Normal Dorsiflexion, CN II-XII Intact, 

Normal Plantar Flexion, Normal Gait, Normal Reflexes, No Motor/Sensory Deficits,

 Oriented x 3


DTR - Lower Extremities: 2+: Knee (R), Knee (L), Ankle (R), Ankle (L)


Psychiatric: Normal Affect, Normal Mood


Skin Exam: Warm, Dry, Intact, Normal Color





Course





- Vital Signs


Last Recorded V/S: 


                                Last Vital Signs











Temp  97.7 F   11/23/20 04:00


 


Pulse  76   11/23/20 04:00


 


Resp  16   11/23/20 04:00


 


BP  143/87 H  11/23/20 04:00


 


Pulse Ox  99   11/23/20 04:00














- Orders/Labs/Meds


Meds: 


Medications














Discontinued Medications














Generic Name Dose Route Start Last Admin





  Trade Name Freq  PRN Reason Stop Dose Admin


 


Hydromorphone HCl  2 mg  11/23/20 05:01  11/23/20 05:12





  Dilaudid  IM  11/23/20 05:02  2 mg





  ONETIME ONE   Administration


 


Promethazine HCl  12.5 mg  11/23/20 05:01  11/23/20 05:12





  Phenergan  IM  11/23/20 05:02  12.5 mg





  ONETIME ONE   Administration














- Re-Assessments/Exams


Free Text/Narrative Re-Assessment/Exam: 





11/23/20 05:17


Reviewing this patient's ER records she has been seen on 9/26/20, 10/11/20, in 

November 7, 9, 11, 12, 14 and today for 23 November.





Reviewing her [place] PDMP she only has 1 prescription for gabapentin 300 

mg #30 which was given by myself on 11/11/2020.





I did give her Dilaudid 2mg IM and Phenergan 12.5mg IM





I again had a rather long discussion with this patient about her usage of the 

emergency department for her chronic pain.  I have given this talk to her in the

 past.  Although I do have some concern of the lack of improvement that she has 

received from her multiple modalities of care to include pain management 

orthopedics as well as her primary care.  She does feel that the Neurontin is 

helping and I will refill that so that we can try to quell some of her ER 

visits.  I will not refill any of her other narcotic prescription such as 

Percocet for her chronic pain.  I will refill her Neurontin tonight.





I still feel that evaluation by physical therapy which she has not done yet as 

she has been guided by her primary care and/or a craniosacral therapist is 

possibly the next step as many of the other modalities have not assisted with 

her pain.  She needs to contact her primary care in the next day or 2 to develop

 a plan what they are going to do for her chronic pain and breakthrough 

management of pain.  He is comfortable with this plan and her questions are 

answered.








Departure





- Departure


Time of Disposition: 05:40


Disposition: Home, Self-Care 01


Clinical Impression: 


 Left lumbar radiculopathy, Left hip pain, Left thigh pain





Chronic pain


Qualifiers:


 Chronic pain type: other chronic pain Qualified Code(s): G89.29 - Other chronic

 pain








- Discharge Information


Prescriptions: 


Gabapentin [Neurontin] 300 mg PO TID #30 cap


Instructions:  Neuropathic Pain


Referrals: 


PCP,Unobtain [Ordering Only Provider] - 


Forms:  ED Department Discharge


Additional Instructions: 


Contact physical therapy today for appointment next available. 


I will refill your gabapentin today as you will run out and your PCP has not 

refilled them. 


I will still recommend a cranio/sacral therapist as previous. 


Contact Dr. Diggs clinic today and develop a plan for your chronic pain which 

is best managed out of the clinic. 


Return to the ED if new or worsening symptoms. 





Sepsis Event Note (ED)





- Evaluation


Sepsis Screening Result: No Definite Risk

## 2020-11-29 ENCOUNTER — HOSPITAL ENCOUNTER (EMERGENCY)
Dept: HOSPITAL 50 - VM.ED | Age: 60
Discharge: HOME | End: 2020-11-29
Payer: COMMERCIAL

## 2020-11-29 DIAGNOSIS — G89.29: Primary | ICD-10-CM

## 2020-11-29 DIAGNOSIS — Z79.899: ICD-10-CM

## 2020-11-30 NOTE — EDM.PDOC
ED HPI GENERAL MEDICAL PROBLEM





- General


Chief Complaint: Back Pain or Injury


Stated Complaint: back pain


Time Seen by Provider: 11/29/20 20:56


Source of Information: Reports: Patient


History Limitations: Reports: No Limitations





- History of Present Illness


INITIAL COMMENTS - FREE TEXT/NARRATIVE: 





Pt. presents to ER with chronic L leg, buttock and hip pain. This is a 

longstanding problem for the patient. She has seen pain management and has had 

MAURY and SI injections. Her medications are not palliating the pain. Denies any 

new trauma. No foot drop. She states that the pain is the same as it has always 

been. She is a patient of Dr. Weaver.


Location: Reports: Lower Extremity, Left


  ** Left Lower Back


Pain Score (Numeric/FACES): 8





- Related Data


                                    Allergies











Allergy/AdvReac Type Severity Reaction Status Date / Time


 


No Known Allergies Allergy   Verified 11/29/20 20:39











Home Meds: 


                                    Home Meds





Cyclobenzaprine [Flexeril] 10 mg PO TID PRN 11/07/20 [History]


Diclofenac Sodium 75 mg PO BID PRN 11/07/20 [History]


Acetaminophen 650 mg PO Q4H PRN 11/14/20 [History]


Gabapentin [Neurontin] 300 mg PO TID #30 cap 11/23/20 [Rx]











Past Medical History





- Past Health History


Medical/Surgical History: Denies Medical/Surgical History


Musculoskeletal History: Reports: Other (See Below)


Other Musculoskeletal History: sciatic pain, left sided SI joint problems


Oncologic (Cancer) History: Reports: Uterine





- Past Surgical History


Female  Surgical History: Reports: Hysterectomy





Social & Family History





- Family History


Family Medical History: Unobtainable





- Tobacco Use


Tobacco Use Status *Q: Never Tobacco User





- Caffeine Use


Caffeine Use: Reports: None





ED ROS GENERAL





- Review of Systems


Review Of Systems: See Below


Constitutional: Reports: No Symptoms


HEENT: Reports: No Symptoms


Respiratory: Reports: No Symptoms


Cardiovascular: Reports: No Symptoms


Endocrine: Reports: No Symptoms


GI/Abdominal: Reports: No Symptoms


: Reports: No Symptoms


Musculoskeletal: Reports: Leg Pain


Skin: Reports: No Symptoms


Neurological: Reports: No Symptoms


Psychiatric: Reports: No Symptoms


Hematologic/Lymphatic: Reports: No Symptoms


Immunologic: Reports: No Symptoms





ED EXAM, GENERAL





- Physical Exam


Exam: See Below


Exam Limited By: No Limitations


General Appearance: Alert, WD/WN, No Apparent Distress


Back Exam: Normal Inspection, Full Range of Motion


Extremities: Normal Inspection, Normal Range of Motion, Non-Tender, No Pedal 

Edema


Neurological: Alert, Oriented, CN II-XII Intact, Normal Reflexes, No 

Motor/Sensory Deficits





Course





- Vital Signs


Last Recorded V/S: 





                                Last Vital Signs











Temp  35.8 C L  11/29/20 20:40


 


Pulse  97   11/29/20 20:40


 


Resp  20   11/29/20 20:40


 


BP  127/75   11/29/20 20:40


 


Pulse Ox  97   11/29/20 20:40














- Orders/Labs/Meds


Meds: 





Medications














Discontinued Medications














Generic Name Dose Route Start Last Admin





  Trade Name Lencho  PRN Reason Stop Dose Admin


 


Acetaminophen/Codeine Phosphate  1 packet  11/29/20 21:05  11/29/20 21:09





  Take Home:  Acetam/Codeine 300-30 Mg, 5 Pack  PO  11/29/20 21:06  1 packet





  ONETIME ONE   Administration


 


Prednisone  1 packet  11/29/20 21:05  11/29/20 21:09





  Take Home: Prednisone 20 Mg, 2 Tab Pack  PO  11/29/20 21:06  1 packet





  ONETIME ONE   Administration














Departure





- Departure


Time of Disposition: 21:20


Disposition: Home, Self-Care 01


Clinical Impression: 


 Chronic pain








- Discharge Information


Instructions:  Radicular Pain, Prednisone tablets, Acetaminophen; Codeine 

tablets 


Forms:  ED Department Discharge


Additional Instructions: 


Continue with current medications. Do not take the tylenol if you are taking the

 tylenol with codeine. 





Prednisone 20mg


2 tabs daily for 1 week





Tylenol #3


1 every 6 hours as needed for pain





Please make an appointment with PT tomorrow.





Follow-up with Dr. Weaver/pain management tomorrow. Ultimately we are not 

supposed to give opiate pain medication for chronic pain in the ER.





Sepsis Event Note (ED)





- Evaluation


Sepsis Screening Result: No Definite Risk





- Focused Exam


Vital Signs: 





                                   Vital Signs











  Temp Pulse Resp BP Pulse Ox


 


 11/29/20 20:40  35.8 C L  97  20  127/75  97














- Problem List Review


Problem List Initiated/Reviewed/Updated: Yes





- Assessment/Plan


Plan: 





Continue with current medications. Do not take the tylenol if you are taking the

 tylenol with codeine. 





Prednisone 20mg


2 tabs daily for 1 week





Tylenol #3


1 every 6 hours as needed for pain





Please make an appointment with PT tomorrow.





Follow-up with Dr. Weaver/pain management tomorrow. Ultimately we are not 

supposed to give opiate pain medication for chronic pain in the ER.

## 2020-12-05 ENCOUNTER — HOSPITAL ENCOUNTER (EMERGENCY)
Dept: HOSPITAL 50 - VM.ED | Age: 60
Discharge: ANOTHER HOSPITAL | End: 2020-12-05
Payer: COMMERCIAL

## 2020-12-05 DIAGNOSIS — R79.89: ICD-10-CM

## 2020-12-05 DIAGNOSIS — Z90.710: ICD-10-CM

## 2020-12-05 DIAGNOSIS — Z79.899: ICD-10-CM

## 2020-12-05 DIAGNOSIS — I26.94: Primary | ICD-10-CM

## 2020-12-05 LAB
ANION GAP SERPL CALC-SCNC: 11.8 MMOL/L (ref 10–20)
CHLORIDE SERPL-SCNC: 105 MMOL/L (ref 98–107)
SODIUM SERPL-SCNC: 140 MMOL/L (ref 136–145)

## 2020-12-05 PROCEDURE — U0002 COVID-19 LAB TEST NON-CDC: HCPCS

## 2020-12-05 NOTE — CR
______________________________________________________________________________   

  

[id number] RAD/RAD Chest PA or AP 1V  

EXAM:  RAD Chest PA or AP 1V  

   

 INDICATION:  SYNCOPE HYPOTENSION.  

   

 COMPARISON:  None.  

   

 DISCUSSION:    

   

 Cardiomediastinal silhouette is normal in size and contour.  

   

 Lungs are clear. No pleural effusion or pneumothorax.  

   

 IMPRESSION:  

 Negative examination of the chest.  

   

 Electronically signed by Simon Noel MD on 12/5/2020 5:57 PM  

   

  

Simon Noel MD                 

 12/05/20 [id number]    

  

Thank you for allowing us to participate in the care of your patient.

## 2020-12-05 NOTE — EDM.PDOC
ED HPI GENERAL MEDICAL PROBLEM





- General


Chief Complaint: Syncope


Stated Complaint: ER


Time Seen by Provider: 12/05/20 16:23


Source of Information: Reports: Patient





- History of Present Illness


INITIAL COMMENTS - FREE TEXT/NARRATIVE: 





Patient comes emergency department today from home with concerns of a syncopal 

episode.  Patient was sitting at the kitchen table she was going to take her 

medications and the next thing she noticed was that she woke up on the ground.  

She was weak pale and diaphoretic.  She felt like she had to have a bowel 

movement.  She got up and went to the bathroom and had a rather large bowel 

movement but continued to complain of feeling just weak tired fatigued and 

drained.  She continued to be diaphoretic and pale.  She summoned the ambulance.

 EMS arrival patient was alert and appropriate did not complain of any shortness

of breath or difficulty breathing or chest pain.  She was brought to the 

emergency department.  Upon arrival to the emergency department she continues to

only complain of just feeling fatigued and tired.  She has absolutely no energy.

 She denies any recent fever or chills.  No palpitations.  No chest pain 

shortness of breath difficulty breathing or cough.  No paresthesias of her upper

or lower extremities.  No abdominal pain nausea or vomiting.  No hematuria 

dysuria or urinary frequency.  She has been constipated the last couple of days 

and had a rather large bowel movement today.  She did take some MiraLAX over the

past couple of days due to the concerns of constipation.  I know this patient 

quite well as well as the emergency department as she has been struggling since 

July for some left lower severe back pain with radiculopathy that radiates 

interestingly around to the anterior proximal aspect of the left femur and into 

her groin.  She has been evaluated for this multiple times.  She does not have 

any of this pain upon arrival.  She does not have any claudication symptoms in 

the past.  She has no pain in her calf.  No history of anticoagulation.  No 

history of coagulopathies she does not smoke and she has not had any 

longstanding sedentary positioning recently.  No recent surgeries.  No Covid 

exposure.  No Covid symptoms.  Otherwise felt well the past couple of days.  She

has no pain in her back.  No paresthesias of her upper or lower extremities.  No

loss of bowel or bladder.





- Related Data


                                    Allergies











Allergy/AdvReac Type Severity Reaction Status Date / Time


 


No Known Allergies Allergy   Verified 12/05/20 17:04











Home Meds: 


                                    Home Meds





Cyclobenzaprine [Flexeril] 10 mg PO TID PRN 11/07/20 [History]


Diclofenac Sodium 75 mg PO BID PRN 11/07/20 [History]


Acetaminophen 650 mg PO Q4H PRN 11/14/20 [History]


Gabapentin [Neurontin] 300 mg PO TID #30 cap 11/23/20 [Rx]


Hydrocodone/Acetaminophen [Norco  Tablet] 1 each PO Q4H PRN 12/05/20 

[History]











Past Medical History





- Past Health History


Medical/Surgical History: Denies Medical/Surgical History


Musculoskeletal History: Reports: Other (See Below)


Other Musculoskeletal History: sciatic pain, left sided SI joint problems


Oncologic (Cancer) History: Reports: Uterine





- Past Surgical History


Female  Surgical History: Reports: Hysterectomy





Social & Family History





- Family History


Family Medical History: Unobtainable





- Tobacco Use


Tobacco Use Status *Q: Never Tobacco User


Second Hand Smoke Exposure: No





- Caffeine Use


Caffeine Use: Reports: None





- Recreational Drug Use


Recreational Drug Use: No





ED ROS GENERAL





- Review of Systems


Review Of Systems: Comprehensive ROS is negative, except as noted in HPI.





- Physical Exam


Exam: See Below


Text/Narrative:: 





She is alert appropriate.  She is quite pale and diaphoretic but she appears in 

no acute distress.  She is not tachypneic.  There is no labored breathing.  

Peers somewhat ill.


Exam Limited By: No Limitations


General Appearance: Alert, WD/WN


Eye Exam: Bilateral Eye: EOMI, PERRL


Ears: Normal External Exam


Nose: Normal Inspection


Throat/Mouth: Normal Inspection, Normal Lips


Head Exam: Atraumatic, Normocephalic


Neck: Normal Inspection, Supple


Respiratory/Chest: No Respiratory Distress, Lungs Clear, Normal Breath Sounds, 

No Accessory Muscle Use, Chest Non-Tender


Cardiovascular: Normal Peripheral Pulses, Regular Rate, Rhythm


GI/Abdominal: Normal Bowel Sounds, Soft, Non-Tender, No Distention


 (Female) Exam: Deferred


Rectal (Female) Exam: Deferred


Neuro Exam (Abbreviated): Alert, Oriented, Normal Cognition, Normal Gait, No 

Motor/Sensory Deficits


Back Exam: Normal Inspection, Full Range of Motion


Extremities: Normal Inspection, Normal Range of Motion, Non-Tender, Normal 

Capillary Refill


Psychiatric: Normal Affect, Normal Mood


Skin Exam: Intact, Cool, Diaphoretic, Pallor


  ** #1 Interpretation


EKG Date: 12/05/20


Time: 16:28


Rhythm: NSR


Rate (Beats/Min): 77


Axis: Normal


P-Wave: Present


QRS: Normal


ST-T: Normal


QT: Normal





  ** #2 Interpretation


EKG Date: 12/05/20


Time: 20:21


Rhythm: NSR


Rate (Beats/Min): 87


Axis: Normal


P-Wave: Present


QRS: Normal


ST-T: Normal


QT: Normal


Comparison: No Change





Course





- Vital Signs


Last Recorded V/S: 


                                Last Vital Signs











Temp  97.4 F   12/05/20 21:13


 


Pulse  87   12/05/20 21:13


 


Resp  12   12/05/20 21:13


 


BP  132/84   12/05/20 21:13


 


Pulse Ox  94 L  12/05/20 21:13














- Orders/Labs/Meds


Orders: 


                               Active Orders 24 hr











 Category Date Time Status


 


 Ang Chest [CT] Stat Exams  12/05/20 22:14 Ordered


 


 CTA Abd Pelv w Cont [CT] Stat Exams  12/05/20 22:04 Ordered











Labs: 


                                Laboratory Tests











  12/05/20 12/05/20 12/05/20 Range/Units





  16:30 16:35 17:11 


 


WBC    5.1  (4.0-10.0)  x10^3/uL


 


RBC    3.62 L  (4.00-5.50)  x10^6/uL


 


Hgb    10.7 L  (12.0-16.0)  g/dL


 


Hct    33.2  (33.0-47.0)  %


 


MCV    91.7  (78.0-93.0)  fL


 


MCH    29.6  (26.0-32.0)  pg


 


MCHC    32.2  (32.0-36.0)  g/dL


 


RDW Coeff of Crista    13.7  (10.0-15.0)  %


 


Plt Count    108 L  (130-400)  x10^3/uL


 


Neut % (Auto)    63.5  (50.0-80.0)  %


 


Lymph % (Auto)    26.4  (25.0-50.0)  %


 


Mono % (Auto)    9.1  (2.0-11.0)  %


 


Eos % (Auto)    0.6  (0.0-4.0)  %


 


Baso % (Auto)    0.4  (0.2-1.2)  %


 


PT     (9.5-12.3)  SEC


 


INR     (2.0-3.5)  


 


APTT     (25.6-32.8)  SEC


 


D-Dimer, Quantitative     (<=0.58)  mg/LFEU


 


Sodium     (136-145)  mmol/L


 


Potassium     (3.5-5.1)  mmol/L


 


Chloride     ()  mmol/L


 


Carbon Dioxide     (21-32)  mmol/L


 


Anion Gap     (10-20)  mmol/L


 


BUN     (7-18)  mg/dL


 


Creatinine     (0.55-1.02)  mg/dL


 


Est Cr Clr Drug Dosing     mL/min


 


Estimated GFR (MDRD)     


 


Glucose     ()  mg/dL


 


Lactic Acid     (0.4-2.0)  mmol/L


 


Calcium     (8.5-10.1)  mg/dL


 


Corrected Calcium     (8.5-10.1)  mg/dL


 


Total Bilirubin     (0.2-1.0)  mg/dL


 


AST     (15-37)  U/L


 


ALT     (14-59)  U/L


 


Alkaline Phosphatase     ()  U/L


 


Troponin I     (<=0.056)  ng/mL


 


Total Protein     (6.4-8.2)  g/dL


 


Albumin     (3.4-5.0)  g/dL


 


Globulin     


 


Albumin/Globulin Ratio     


 


Urine Color  Dark yellow H    (YELLOW)  


 


Urine Appearance  Clear    (CLEAR)  


 


Urine pH  5.5    (5.0-8.0)  


 


Ur Specific Gravity  1.025    


 


Urine Protein  Trace H    (NEGATIVE)  mg/dL


 


Urine Glucose (UA)  Negative    (NEGATIVE)  mg/dL


 


Urine Ketones  Trace H    (NEGATIVE)  mg/dL


 


Urine Occult Blood  Negative    (NEGATIVE)  


 


Urine Nitrite  Negative    (NEGATIVE)  


 


Urine Bilirubin  Small H    (NEGATIVE)  


 


Urine Urobilinogen  0.2    (0.2)  EU/dL


 


Ur Leukocyte Esterase  Negative    (NEGATIVE)  


 


Urine RBC  0-5    (NOT SEEN)  /HPF


 


Urine WBC  0-5    (NOT SEEN)  /HPF


 


Ur Squamous Epith Cells  Few H    (NEGATIVE)  /HPF


 


Urine Bacteria  Few H    (NEGATIVE)  /HPF


 


SARS CoV-2 RNA Rapid MIKE   Negative   (NEGATIVE)  














  12/05/20 12/05/20 12/05/20 Range/Units





  17:11 17:11 20:12 


 


WBC     (4.0-10.0)  x10^3/uL


 


RBC     (4.00-5.50)  x10^6/uL


 


Hgb     (12.0-16.0)  g/dL


 


Hct     (33.0-47.0)  %


 


MCV     (78.0-93.0)  fL


 


MCH     (26.0-32.0)  pg


 


MCHC     (32.0-36.0)  g/dL


 


RDW Coeff of Crista     (10.0-15.0)  %


 


Plt Count     (130-400)  x10^3/uL


 


Neut % (Auto)     (50.0-80.0)  %


 


Lymph % (Auto)     (25.0-50.0)  %


 


Mono % (Auto)     (2.0-11.0)  %


 


Eos % (Auto)     (0.0-4.0)  %


 


Baso % (Auto)     (0.2-1.2)  %


 


PT     (9.5-12.3)  SEC


 


INR     (2.0-3.5)  


 


APTT     (25.6-32.8)  SEC


 


D-Dimer, Quantitative     (<=0.58)  mg/LFEU


 


Sodium  140    (136-145)  mmol/L


 


Potassium  3.8    (3.5-5.1)  mmol/L


 


Chloride  105    ()  mmol/L


 


Carbon Dioxide  27    (21-32)  mmol/L


 


Anion Gap  11.8    (10-20)  mmol/L


 


BUN  21 H    (7-18)  mg/dL


 


Creatinine  1.0    (0.55-1.02)  mg/dL


 


Est Cr Clr Drug Dosing  53.16    mL/min


 


Estimated GFR (MDRD)  56    


 


Glucose  120 H    ()  mg/dL


 


Lactic Acid   3.0 H*   (0.4-2.0)  mmol/L


 


Calcium  8.4 L    (8.5-10.1)  mg/dL


 


Corrected Calcium  9.52    (8.5-10.1)  mg/dL


 


Total Bilirubin  0.6    (0.2-1.0)  mg/dL


 


AST  17    (15-37)  U/L


 


ALT  30    (14-59)  U/L


 


Alkaline Phosphatase  58    ()  U/L


 


Troponin I  0.203 H*   1.108 H*  (<=0.056)  ng/mL


 


Total Protein  5.7 L    (6.4-8.2)  g/dL


 


Albumin  2.6 L    (3.4-5.0)  g/dL


 


Globulin  3.1    


 


Albumin/Globulin Ratio  0.84    


 


Urine Color     (YELLOW)  


 


Urine Appearance     (CLEAR)  


 


Urine pH     (5.0-8.0)  


 


Ur Specific Gravity     


 


Urine Protein     (NEGATIVE)  mg/dL


 


Urine Glucose (UA)     (NEGATIVE)  mg/dL


 


Urine Ketones     (NEGATIVE)  mg/dL


 


Urine Occult Blood     (NEGATIVE)  


 


Urine Nitrite     (NEGATIVE)  


 


Urine Bilirubin     (NEGATIVE)  


 


Urine Urobilinogen     (0.2)  EU/dL


 


Ur Leukocyte Esterase     (NEGATIVE)  


 


Urine RBC     (NOT SEEN)  /HPF


 


Urine WBC     (NOT SEEN)  /HPF


 


Ur Squamous Epith Cells     (NEGATIVE)  /HPF


 


Urine Bacteria     (NEGATIVE)  /HPF


 


SARS CoV-2 RNA Rapid MIKE     (NEGATIVE)  














  12/05/20 12/05/20 12/05/20 Range/Units





  20:12 20:12 20:14 


 


WBC     (4.0-10.0)  x10^3/uL


 


RBC     (4.00-5.50)  x10^6/uL


 


Hgb     (12.0-16.0)  g/dL


 


Hct     (33.0-47.0)  %


 


MCV     (78.0-93.0)  fL


 


MCH     (26.0-32.0)  pg


 


MCHC     (32.0-36.0)  g/dL


 


RDW Coeff of Crista     (10.0-15.0)  %


 


Plt Count     (130-400)  x10^3/uL


 


Neut % (Auto)     (50.0-80.0)  %


 


Lymph % (Auto)     (25.0-50.0)  %


 


Mono % (Auto)     (2.0-11.0)  %


 


Eos % (Auto)     (0.0-4.0)  %


 


Baso % (Auto)     (0.2-1.2)  %


 


PT  10.1    (9.5-12.3)  SEC


 


INR  0.9 L    (2.0-3.5)  


 


APTT   23.0 L   (25.6-32.8)  SEC


 


D-Dimer, Quantitative    > 35.20 H  (<=0.58)  mg/LFEU


 


Sodium     (136-145)  mmol/L


 


Potassium     (3.5-5.1)  mmol/L


 


Chloride     ()  mmol/L


 


Carbon Dioxide     (21-32)  mmol/L


 


Anion Gap     (10-20)  mmol/L


 


BUN     (7-18)  mg/dL


 


Creatinine     (0.55-1.02)  mg/dL


 


Est Cr Clr Drug Dosing     mL/min


 


Estimated GFR (MDRD)     


 


Glucose     ()  mg/dL


 


Lactic Acid     (0.4-2.0)  mmol/L


 


Calcium     (8.5-10.1)  mg/dL


 


Corrected Calcium     (8.5-10.1)  mg/dL


 


Total Bilirubin     (0.2-1.0)  mg/dL


 


AST     (15-37)  U/L


 


ALT     (14-59)  U/L


 


Alkaline Phosphatase     ()  U/L


 


Troponin I     (<=0.056)  ng/mL


 


Total Protein     (6.4-8.2)  g/dL


 


Albumin     (3.4-5.0)  g/dL


 


Globulin     


 


Albumin/Globulin Ratio     


 


Urine Color     (YELLOW)  


 


Urine Appearance     (CLEAR)  


 


Urine pH     (5.0-8.0)  


 


Ur Specific Gravity     


 


Urine Protein     (NEGATIVE)  mg/dL


 


Urine Glucose (UA)     (NEGATIVE)  mg/dL


 


Urine Ketones     (NEGATIVE)  mg/dL


 


Urine Occult Blood     (NEGATIVE)  


 


Urine Nitrite     (NEGATIVE)  


 


Urine Bilirubin     (NEGATIVE)  


 


Urine Urobilinogen     (0.2)  EU/dL


 


Ur Leukocyte Esterase     (NEGATIVE)  


 


Urine RBC     (NOT SEEN)  /HPF


 


Urine WBC     (NOT SEEN)  /HPF


 


Ur Squamous Epith Cells     (NEGATIVE)  /HPF


 


Urine Bacteria     (NEGATIVE)  /HPF


 


SARS CoV-2 RNA Rapid MIKE     (NEGATIVE)  











Meds: 


Medications














Discontinued Medications














Generic Name Dose Route Start Last Admin





  Trade Name Leoq  PRN Reason Stop Dose Admin


 


Aspirin  324 mg  12/05/20 18:32  12/05/20 19:00





  Aspirin  PO  12/05/20 18:33  324 mg





  ONETIME ONE   Administration


 


Heparin Sodium (Porcine)  4,000 units  12/05/20 22:13  12/05/20 22:45





  Heparin Sodium  IVPUSH  12/05/20 22:14  4,000 units





  .BOLUS ONE   Administration


 


Hydromorphone HCl  1 mg  12/05/20 23:06  12/05/20 23:10





  Dilaudid  IVPUSH  12/05/20 23:07  1 mg





  ONETIME ONE   Administration


 


Lactated Ringer's  1,000 mls @ 999 mls/hr  12/05/20 16:45  12/05/20 16:45





  Ringers, Lactated  IV  12/05/20 17:45  999 mls/hr





  ONETIME ONE   Administration


 


Lactated Ringer's  1,000 mls @ 999 mls/hr  12/05/20 17:53  12/05/20 17:55





  Ringers, Lactated  IV  12/05/20 18:53  999 mls/hr





  ONETIME ONE   Administration


 


Heparin Sodium/Sodium Chloride  25,000 units in 500 mls @ 20 mls/hr  12/05/20 

22:15  12/05/20 22:46





  Heparin 25,000 Units In 1/2 Ns 500 Ml  IV   1,000 units/hr





  TITRATE JERAMY   20 mls/hr





    Administration





  Protocol  





  1,000 UNITS/HR  


 


Iopamidol  100 ml  12/05/20 22:29  12/05/20 22:31





  Isovue-300 (61%)  IVPUSH  12/05/20 22:30  100 ml





  ONETIME ONE   Administration


 


Sodium Chloride  10 ml  12/05/20 16:38 





  Saline Flush  FLUSH  





  ASDIRECTED PRN  





  Keep Vein Open  














- Radiology Interpretation


Free Text/Narrative:: 





Chest x-ray per radiology negative as of examination of the chest.





CT angiogram of the chest shows fairly significant bilateral pulmonary artery 

emboli with right cardiac strain.  No consolidation pleural effusion or 

pneumothorax.





CT abdomen and pelvis with IV contrast normal caliber aorta without dissection 

or aneurysm.  Widely patent aortic branches and iliac arteries.  Left peripelvic

 cyst versus hydronephrosis.  Scattered colonic diverticulosis without 

diverticulitis.





- Re-Assessments/Exams


Free Text/Narrative Re-Assessment/Exam: 





Covid rapid test was negative.





Patient was hypotensive and was given a total of 2 L of LR bolus with a blood 

pressure initially of 84 systolically.





12/Laboratory evaluation with a white blood cell count of 5.1, hemoglobin 10.7, 

platelets 108.





Lactic acid of 3.0 BUN 21 creatinine 1.0 normal sodium and potassium normal 

liver enzymes.





She had an elevated troponin at 0.203.  She has not had any chest pain or 

shortness of breath or difficulty breathing recently.  She was given 324 of 

aspirin orally.





Her urinalysis is negative for any infectious process.  Small amount of ketones.





Blood pressure did improve following the LR boluses.  But she continues to be 

somewhat pale and mildly diaphoretic.  Her troponin is mildly elevated she has 

not had any chest pain shortness of breath or difficulty breathing.  She was 

given aspirin.  I wonder if she did not have some type of cardiac dysrhythmia or

 other cause elevated her troponin.  We will trend her troponin and recheck an 

EKG and monitor her cardiac rhythm over the next 4 hours.





Resting quite comfortably in the emergency department she had no ectopy in the 

emergency department.  She had no syncope.  She is still complaining of 

generalized malaise and fatigue and just really no energy.  She is still pale 

and somewhat diaphoretic.  Repeat of her troponin shows an elevation of 1.108.  

Her repeat of her EKG is unchanged.  With the concerns of syncope hypotension 

and elevated troponin I also have concerns for some type of pathology such as an

 aortic presentation.





T a chest abdomen pelvis was completed.  And was found that she had fairly 

significant bilateral pulmonary embolism with a small thin tail of saddle PE as 

well as noted RV strain.  There is noted RV strain is what most likely is 

causing her elevation of her troponin.  She is not tachypneic she is not 

tachycardic nor does she have any chest pain she has not had any recent 

surgeries this would be an iatrogenic pulmonary embolism.  With elevated 

troponin RV strain and the iatrogenic component of her pulmonary embolism I 

called and spoke with Dr. Johnson at [facility] in [place] after initiating the patient 

on a heparin gtt and bolus. HPI ER COURSE findings and concerns were relayed to 

him. He accepted the patient in transfer at this time for further care 

management and workup at [facility]. 





Michael the findings of the fairly extensive bilateral pulmonary embolism with the 

patient.  I am unsure of what would be the trigger for her to have these.  She 

is aware of the need for transfer to [facility] in [place].  She is comfortable with 

this plan and her questions are answered.








Departure





- Departure


Time of Disposition: 22:30


Disposition: DC/Tfer to [facility] 02


Clinical Impression: 


 Elevated troponin





Pulmonary embolism


Qualifiers:


 Pulmonary embolism type: multiple subsegmental (without acute cor pulmonale) 

Qualified Code(s): I26.94 - Multiple subsegmental pulmonary emboli without acute

 cor pulmonale








- Discharge Information


Referrals: 


Nancie Weaver MD [Primary Care Provider] - 


Forms:  ED Department Discharge, Interfacility Transfer FRANNY





Sepsis Event Note (ED)





- Evaluation


Sepsis Screening Result: No Definite Risk





- My Orders


Last 24 Hours: 


My Active Orders





12/05/20 22:04


CTA Abd Pelv w Cont [CT] Stat 





12/05/20 22:14


Ang Chest [CT] Stat 














- Assessment/Plan


Last 24 Hours: 


My Active Orders





12/05/20 22:04


CTA Abd Pelv w Cont [CT] Stat 





12/05/20 22:14


Ang Chest [CT] Stat

## 2020-12-07 NOTE — CT
______________________________________________________________________________   

  

[id number] CT/CTA Chest Abdomen Pelvis  

EXAM: CTA Chest Abdomen Pelvis  

   

 CLINICAL DATA: SYNCOPE, HYPOTENSION, ? AORTA  

   

 COMPARISON STUDY: Radiograph from today.  

   

 FINDINGS:   

   



   

 Thoracic aorta is normal in caliber. No dissection.  

   

 Extensive bilateral pulmonary emboli, including saddle embolus draped over the  

 pulmonary trunk bifurcation extending into the main right and left pulmonary  

 arteries, right upper and middle lobar branches. Left lower lobar branch, and  

 numerous downstream segmental and subsegmental branches of both lungs.  

   

 Bowed appearance of the intraventricular septum suggests right heart strain.  

   

 Few scattered solid noncalcified pulmonary nodules measuring 4 mm or less in  

 both lungs. Additionally there are linear opacities in the posterior lower  

 lobes.  

   

 No pleural effusion or pneumothorax. No evidence of pneumonia or pulmonary  

 infarction.  

   

 No mediastinal or hilar lymphadenopathy.  

   

 Abdomen and pelvis:  

   

 Liver, spleen, gallbladder, pancreas, and adrenal glands are unremarkable. Sinus  

 cysts in the kidneys left greater than right. Kidneys are otherwise  

 unremarkable.  

   

 No evidence of bowel injury, obstruction, or inflammation. Appendix is normal.  

   

 7 x 10 x 9 mm exophytic mass arising from the posterior stomach cardia just  

 distal to the gastroesophageal junction (series 4 image 69 and series 5 image  

 62). Finding could represent a small gastric diverticulum. Consider endoscopy  

 and/or follow-up CT within 3-6 months to exclude mass.  

   

 Abdominal aorta is normal in caliber. No dissection. Iliac arteries are also  

 normal.  

   

 Urinary bladder is intact.  

   

 No lymphadenopathy, free fluid, or pneumoperitoneum.  

   

 Bones and soft tissues:  

   

 No fracture, compression deformity, or osseous lesion.  

   

   

 IMPRESSION:  

   

 Extensive bilateral pulmonary emboli, including a saddle embolus extending into  

 numerous lobar branches bilaterally. Bowed intraventricular septum suggesting  

 right heart strain. Correlate with EKG.  

   

 No other acute findings.  

   

 Additional findings are described above.  

   

 Electronically signed by Simon Noel MD on 12/6/2020 9:30 AM  

   

  

Simon Noel MD                 

 12/07/20 [id number]    

  

Thank you for allowing us to participate in the care of your patient.